# Patient Record
Sex: MALE | Race: WHITE | NOT HISPANIC OR LATINO | URBAN - METROPOLITAN AREA
[De-identification: names, ages, dates, MRNs, and addresses within clinical notes are randomized per-mention and may not be internally consistent; named-entity substitution may affect disease eponyms.]

---
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
## 2024-01-12 DIAGNOSIS — D66 HEMOPHILIA A: ICD-10-CM

## 2024-01-12 RX ORDER — ANTIHEMOPH.FVIII REC,FC FUSION 3000 UNIT
3000 VIAL (EA) INTRAVENOUS
Qty: 8 EACH | Refills: 12 | Status: SHIPPED | OUTPATIENT
Start: 2024-01-15 | End: 2024-02-26 | Stop reason: SDUPTHER

## 2024-01-12 RX ORDER — ANTIHEMOPH.FVIII REC,FC FUSION 3000 UNIT
3000 VIAL (EA) INTRAVENOUS
Qty: 3 EACH | Refills: 3 | Status: SHIPPED | OUTPATIENT
Start: 2024-01-12 | End: 2024-02-26 | Stop reason: SDUPTHER

## 2024-01-22 ENCOUNTER — PATIENT MESSAGE (OUTPATIENT)
Dept: PEDIATRIC HEMATOLOGY/ONCOLOGY | Facility: CLINIC | Age: 19
End: 2024-01-22
Payer: COMMERCIAL

## 2024-02-19 ENCOUNTER — TELEPHONE (OUTPATIENT)
Dept: PEDIATRIC HEMATOLOGY/ONCOLOGY | Facility: CLINIC | Age: 19
End: 2024-02-19
Payer: COMMERCIAL

## 2024-02-19 DIAGNOSIS — D66 HEMOPHILIA A: Primary | ICD-10-CM

## 2024-02-19 RX ORDER — ANTIHEMOPHILIC FACTOR (RECOMBINANT), FC-VWF-XTEN FUSION PROTEIN-EHTL 4000 (+/-)
4000 KIT INTRAVENOUS WEEKLY
Qty: 12 EACH | Refills: 4 | Status: SHIPPED | OUTPATIENT
Start: 2024-02-19 | End: 2024-02-22 | Stop reason: SDUPTHER

## 2024-02-20 ENCOUNTER — TELEPHONE (OUTPATIENT)
Dept: PEDIATRIC HEMATOLOGY/ONCOLOGY | Facility: CLINIC | Age: 19
End: 2024-02-20
Payer: COMMERCIAL

## 2024-02-20 NOTE — TELEPHONE ENCOUNTER
Called mom regarding her request for Altuviio. Informed mom that medication had been approved and sent to [facility] at a dose of 4000U once weekly per Dr Verdugo's recommendations.     ----- Message from Jose Verdugo MD sent at 2/19/2024  3:20 PM CST -----  Regarding: RE: Altuviio  Sent to [facility]    4000u once a  week  ----- Message -----  From: Loren Fong RN  Sent: 2/19/2024   3:11 PM CST  To: Jose Verdugo MD  Subject: Altuviio                                         Called mom back regarding Altuviio. She would like to give it a try for Jhonathan if you are still ok with it.

## 2024-02-22 DIAGNOSIS — D66 HEMOPHILIA A: ICD-10-CM

## 2024-02-22 RX ORDER — ANTIHEMOPHILIC FACTOR (RECOMBINANT), FC-VWF-XTEN FUSION PROTEIN-EHTL 4000 (+/-)
4000 KIT INTRAVENOUS WEEKLY
Qty: 12 EACH | Refills: 4 | Status: SHIPPED | OUTPATIENT
Start: 2024-02-22 | End: 2024-02-26 | Stop reason: SDUPTHER

## 2024-02-26 ENCOUNTER — PATIENT MESSAGE (OUTPATIENT)
Dept: PEDIATRIC HEMATOLOGY/ONCOLOGY | Facility: CLINIC | Age: 19
End: 2024-02-26
Payer: COMMERCIAL

## 2024-02-26 DIAGNOSIS — D66 HEMOPHILIA A: ICD-10-CM

## 2024-02-26 RX ORDER — ANTIHEMOPH.FVIII REC,FC FUSION 3000 UNIT
3000 VIAL (EA) INTRAVENOUS
Qty: 3 EACH | Refills: 3 | Status: SHIPPED | OUTPATIENT
Start: 2024-02-26

## 2024-02-26 RX ORDER — ANTIHEMOPHILIC FACTOR (RECOMBINANT), FC-VWF-XTEN FUSION PROTEIN-EHTL 4000 (+/-)
4000 KIT INTRAVENOUS WEEKLY
Qty: 12 EACH | Refills: 4 | Status: SHIPPED | OUTPATIENT
Start: 2024-02-26

## 2024-02-26 RX ORDER — ANTIHEMOPH.FVIII REC,FC FUSION 3000 UNIT
3000 VIAL (EA) INTRAVENOUS
Qty: 8 EACH | Refills: 12 | Status: SHIPPED | OUTPATIENT
Start: 2024-02-26

## 2024-02-27 ENCOUNTER — TELEPHONE (OUTPATIENT)
Dept: PEDIATRIC HEMATOLOGY/ONCOLOGY | Facility: CLINIC | Age: 19
End: 2024-02-27
Payer: COMMERCIAL

## 2024-02-27 NOTE — TELEPHONE ENCOUNTER
[id number]- Received call from Tam ([facility]) regarding rx sent yesterday for Eloctate and Altuviiio.   [phone]- Clinicals faxed to [phone]

## 2024-03-01 ENCOUNTER — TELEPHONE (OUTPATIENT)
Dept: PEDIATRIC HEMATOLOGY/ONCOLOGY | Facility: CLINIC | Age: 19
End: 2024-03-01
Payer: COMMERCIAL

## 2024-03-01 NOTE — TELEPHONE ENCOUNTER
Called [facility] [phone], spoke to Tam. Clarifications given regarding multiple prescriptions sent in. Informed them that patient's mother requested that pt try Altuviiio since once weekly as opposed to Eloctate twice a week. Tam to dispense both medications. Will see how patient tolerates medication and adjust accordingly.     ----- Message from Shilpa Herrera sent at 3/1/2024 11:56 AM CST -----  Contact: Pharmacy [phone]  Pharmacy is calling to clarify or change an RX.  RX name:  FVIII rec,Fc-VWF-XTEN,BDD-ehtl (ALTUVIIIO) 4000 (+/-) unit SolR /factor VIII rec,Fc fusion prot (ELOCTATE) 3,000 unit injection   What do they need to clarify:  calling to receive clarification   Additional comments: Juan Carlos

## 2024-07-25 ENCOUNTER — LAB VISIT (OUTPATIENT)
Dept: LAB | Facility: HOSPITAL | Age: 19
End: 2024-07-25
Attending: PEDIATRICS
Payer: COMMERCIAL

## 2024-07-25 ENCOUNTER — OFFICE VISIT (OUTPATIENT)
Dept: PEDIATRIC HEMATOLOGY/ONCOLOGY | Facility: CLINIC | Age: 19
End: 2024-07-25
Payer: COMMERCIAL

## 2024-07-25 VITALS
WEIGHT: 207.81 LBS | TEMPERATURE: 98 F | BODY MASS INDEX: 28.15 KG/M2 | SYSTOLIC BLOOD PRESSURE: 139 MMHG | HEART RATE: 81 BPM | HEIGHT: 72 IN | DIASTOLIC BLOOD PRESSURE: 75 MMHG | RESPIRATION RATE: 18 BRPM

## 2024-07-25 DIAGNOSIS — D66 HEMOPHILIA A: Primary | ICD-10-CM

## 2024-07-25 DIAGNOSIS — D66 HEMOPHILIA A: ICD-10-CM

## 2024-07-25 LAB
BASOPHILS # BLD AUTO: 0.03 K/UL (ref 0–0.2)
BASOPHILS NFR BLD: 0.4 % (ref 0–1.9)
DIFFERENTIAL METHOD BLD: NORMAL
EOSINOPHIL # BLD AUTO: 0 K/UL (ref 0–0.5)
EOSINOPHIL NFR BLD: 0.5 % (ref 0–8)
ERYTHROCYTE [DISTWIDTH] IN BLOOD BY AUTOMATED COUNT: 12.5 % (ref 11.5–14.5)
HCT VFR BLD AUTO: 44.4 % (ref 40–54)
HGB BLD-MCNC: 15.2 G/DL (ref 14–18)
IMM GRANULOCYTES # BLD AUTO: 0.02 K/UL (ref 0–0.04)
IMM GRANULOCYTES NFR BLD AUTO: 0.3 % (ref 0–0.5)
LYMPHOCYTES # BLD AUTO: 2 K/UL (ref 1–4.8)
LYMPHOCYTES NFR BLD: 24.9 % (ref 18–48)
MCH RBC QN AUTO: 29.9 PG (ref 27–31)
MCHC RBC AUTO-ENTMCNC: 34.2 G/DL (ref 32–36)
MCV RBC AUTO: 87 FL (ref 82–98)
MONOCYTES # BLD AUTO: 0.5 K/UL (ref 0.3–1)
MONOCYTES NFR BLD: 6.6 % (ref 4–15)
NEUTROPHILS # BLD AUTO: 5.4 K/UL (ref 1.8–7.7)
NEUTROPHILS NFR BLD: 67.3 % (ref 38–73)
NRBC BLD-RTO: 0 /100 WBC
PLATELET # BLD AUTO: 259 K/UL (ref 150–450)
PMV BLD AUTO: 11.1 FL (ref 9.2–12.9)
RBC # BLD AUTO: 5.09 M/UL (ref 4.6–6.2)
RETICS/RBC NFR AUTO: 2 % (ref 0.4–2)
WBC # BLD AUTO: 7.99 K/UL (ref 3.9–12.7)

## 2024-07-25 PROCEDURE — 85240 CLOT FACTOR VIII AHG 1 STAGE: CPT | Performed by: PEDIATRICS

## 2024-07-25 PROCEDURE — 36415 COLL VENOUS BLD VENIPUNCTURE: CPT | Performed by: PEDIATRICS

## 2024-07-25 PROCEDURE — 85025 COMPLETE CBC W/AUTO DIFF WBC: CPT | Performed by: PEDIATRICS

## 2024-07-25 PROCEDURE — 99999 PR PBB SHADOW E&M-EST. PATIENT-LVL III: CPT | Mod: PBBFAC,,, | Performed by: PEDIATRICS

## 2024-07-25 PROCEDURE — 85045 AUTOMATED RETICULOCYTE COUNT: CPT | Performed by: PEDIATRICS

## 2024-07-25 PROCEDURE — 85335 FACTOR INHIBITOR TEST: CPT | Performed by: PEDIATRICS

## 2024-07-25 RX ORDER — TRANEXAMIC ACID 650 MG/1
1300 TABLET ORAL 3 TIMES DAILY
Qty: 50 TABLET | Refills: 3 | Status: SHIPPED | OUTPATIENT
Start: 2024-07-25

## 2024-07-25 RX ORDER — ANTIHEMOPH.FVIII REC,FC FUSION 3000 UNIT
3000 VIAL (EA) INTRAVENOUS
Qty: 3 EACH | Refills: 3 | Status: SHIPPED | OUTPATIENT
Start: 2024-07-25

## 2024-07-25 RX ORDER — ANTIHEMOPHILIC FACTOR (RECOMBINANT), FC-VWF-XTEN FUSION PROTEIN-EHTL 4000 (+/-)
4000 KIT INTRAVENOUS WEEKLY
Qty: 12 EACH | Refills: 4 | Status: SHIPPED | OUTPATIENT
Start: 2024-07-25

## 2024-07-25 NOTE — PROGRESS NOTES
"Subjective:       Patient ID: Jhonathan Langston is a 19 y.o. male.    Chief Complaint: No chief complaint on file.  Jhonathan is a 19year old with a new diagnosis of mild Factor 8 deficiency    Initial consult: He hasnt had any real bleeding problems although he does bruise easily.  Jhonathan's brother Chandrakant was diagnosed with mild F8 deficiency so Jhonathan was screened.  Found to have a PTT 46.8 and F8 level of 24  Maternal family member was a "free bleeder"    Interim history:  Done well since last visit. Nate Altuvioo extremely well with no breakthrough bleeding   Did extremely well with no bleeding or need for extra factor    HPI  Review of Systems   Constitutional: Negative for activity change, appetite change, chills, diaphoresis, fatigue, fever and unexpected weight change.   HENT: Negative for hearing loss, mouth sores, nosebleeds, postnasal drip, rhinorrhea and sore throat.    Eyes: Negative for photophobia and visual disturbance.   Respiratory: Negative for cough and shortness of breath.    Cardiovascular: Negative for chest pain and palpitations.   Gastrointestinal: Negative for abdominal distention, abdominal pain, blood in stool, constipation, diarrhea, nausea and vomiting.   Genitourinary: Negative for decreased urine volume, dysuria, flank pain, frequency and hematuria.   Musculoskeletal: Negative for gait problem, joint swelling, neck pain and neck stiffness.   Skin: Negative for color change, pallor and rash.   Neurological: Negative for dizziness, tremors, seizures, weakness and headaches.   Hematological: Negative for adenopathy. Bruises/bleeds easily.   Psychiatric/Behavioral: The patient is not nervous/anxious.        Objective:      Physical Exam   Constitutional: He is oriented to person, place, and time. He appears well-developed and well-nourished.   HENT:   Head: Normocephalic and atraumatic.   Right Ear: External ear normal.   Left Ear: External ear normal.   Nose: Nose normal.   Mouth/Throat: " Oropharynx is clear and moist.   Eyes: Pupils are equal, round, and reactive to light. EOM are normal.   Neck: Normal range of motion. Neck supple.   Cardiovascular: Normal rate and regular rhythm. Exam reveals no gallop and no friction rub.   No murmur heard.  Pulmonary/Chest: Effort normal and breath sounds normal. He has no wheezes. He has no rales.   Abdominal: Soft. Bowel sounds are normal. He exhibits no distension and no mass. There is no tenderness. There is no rebound and no guarding.   Musculoskeletal: Normal range of motion.   Lymphadenopathy:     He has no cervical adenopathy.   Neurological: He is alert and oriented to person, place, and time. He has normal reflexes. He exhibits normal muscle tone.   Skin: Skin is warm. No rash noted. No erythema. No pallor.           Latest Reference Range & Units 06/28/23 14:25   WBC 3.90 - 12.70 K/uL 6.90   RBC 4.60 - 6.20 M/uL 5.36   Hemoglobin 14.0 - 18.0 g/dL 15.5   Hematocrit 40.0 - 54.0 % 45.8   MCV 82 - 98 fL 85   MCH 27.0 - 31.0 pg 28.9   MCHC 32.0 - 36.0 g/dL 33.8   RDW 11.5 - 14.5 % 12.8   Platelets 150 - 450 K/uL 240   MPV 9.2 - 12.9 fL 10.8   Gran % 38.0 - 73.0 % 60.6   Lymph % 18.0 - 48.0 % 29.1   Mono % 4.0 - 15.0 % 8.0   Eosinophil % 0.0 - 8.0 % 1.4   Basophil % 0.0 - 1.9 % 0.6   Immature Granulocytes 0.0 - 0.5 % 0.3   Gran # (ANC) 1.8 - 7.7 K/uL 4.2   Lymph # 1.0 - 4.8 K/uL 2.0   Mono # 0.3 - 1.0 K/uL 0.6   Eos # 0.0 - 0.5 K/uL 0.1   Baso # 0.00 - 0.20 K/uL 0.04   Immature Grans (Abs) 0.00 - 0.04 K/uL 0.02   nRBC 0 /100 WBC 0   Differential Method  Automated   Retic 0.4 - 2.0 % 1.6   Factor VIII Activity 60 - 170 % 28 (L)   (L): Data is abnormally low  Assessment:       1. Hemophilia A        Plan:       20 yo with mild Factor 8 Deficiency       I expect a very mild bleeding history given his factor level.  Mostly I would be scared of bleeding from trauma or from planned surgical procedures.  I discussed avoidance of heavy contact activities but each  of these can be individually discussed.  HE is passionate about playing football and has a scholarship to play football. Both him and parents understand the risk - yet want to play anyways.   Given multiple uses of factor - we have agreed to try prophylaxis during football season.  Will give Altuvioo weekly .  He tolerated this well without any major bleeding issues  Will use Eloctate for rescue bleeding as well.  Script sent as well     I have also sent in a script for Lysteda in case of mucosal bleeding      Dental every 6 months  Contact numbers given for emergency         I will f/u with family in 1 yr or sooner if other bleeding problems    I spent 60 min with family >50% in counseling

## 2024-07-26 LAB — FACT VIII ACT/NOR PPP: 19 % (ref 60–170)

## 2024-07-29 LAB — FACT VIII INHIB PPP-ACNC: 0 BU

## 2024-11-14 ENCOUNTER — TELEPHONE (OUTPATIENT)
Dept: PEDIATRIC HEMATOLOGY/ONCOLOGY | Facility: CLINIC | Age: 19
End: 2024-11-14
Payer: COMMERCIAL

## 2024-11-14 NOTE — TELEPHONE ENCOUNTER
"Pt's mom called, reports pt rolled his ankle at football practice today and heard a pop, has significant swelling to his ankle, is asking what she should do. Instructed mom to bring pt to ED for evaluation and to administer rescue dose of factor. 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 Mom reports pt received his prescribed Altuviiio dose on Sunday, is asking if he needs another dose. Informed mom per Dr Verdugo's note from July that pt is to use Eloctate for rescue bleeding, asked mom if pt has that on hand and if he has received it since his injury today. Mom states they do have eloctate and he has not received a dose yet. Instructed mom to administer eloctate dose to pt now, pt to ice and elevate ankle, keep this office updated, call after his xray tomorrow with update. Mom repeated back and verbalized understanding.   "

## 2024-11-15 ENCOUNTER — PATIENT MESSAGE (OUTPATIENT)
Dept: PEDIATRIC HEMATOLOGY/ONCOLOGY | Facility: CLINIC | Age: 19
End: 2024-11-15
Payer: COMMERCIAL

## 2024-11-15 ENCOUNTER — TELEPHONE (OUTPATIENT)
Dept: PEDIATRIC HEMATOLOGY/ONCOLOGY | Facility: CLINIC | Age: 19
End: 2024-11-15
Payer: COMMERCIAL

## 2024-11-15 NOTE — TELEPHONE ENCOUNTER
"Pt's mom called this morning with an update on pt's ankle. Mom states pt was in a lot of pain last night so they did try to get him seen at [facility] who is usually open until 7 PM per mom but they closed at 5 PM yesterday, so they took pt to ED but was told it would be a 4 hour wait, so they returned home and iced and elevated pt's ankle. Mom confirmed pt received dose of eloctate yesterday evening and did notice that swelling went down some within an hour of factor dose. Mom reports pt seen at [facility] this morning, xray done that showed no broken bones, "looks like a bad sprain" but unable to see if there is any soft tissue/ligament damage. Mom states pt was put in a boot x 2 weeks and will follow up with PT next week, will follow up with [facility] in about 2 weeks to reassess if better vs needing MRI. Mom states pt prescribed a steroid pack. Mom reports pt has 2 doses of Eloctate at home and 4 doses of Altuviiio, would be due his weekly Altuviiio dose on Sunday. Informed Dr Verdugo of above, he state pt to get repeat Eloctate factor dose today and Altuviiio dose tomorrow instead of Sunday, call Monday with an update. Informed mom of above info and instructions, she verbalized complete understanding.   "

## 2025-07-07 ENCOUNTER — TELEPHONE (OUTPATIENT)
Dept: PEDIATRIC HEMATOLOGY/ONCOLOGY | Facility: CLINIC | Age: 20
End: 2025-07-07
Payer: COMMERCIAL

## 2025-07-07 NOTE — TELEPHONE ENCOUNTER
Maranda KING MA called the patient's parent/guardian to schedule  a follow-up appointment with Dr. Jose Verdugo M.D.. No answer. Left a voicemail message with the callback number for scheduling.

## 2025-07-14 ENCOUNTER — PATIENT MESSAGE (OUTPATIENT)
Dept: PEDIATRIC HEMATOLOGY/ONCOLOGY | Facility: CLINIC | Age: 20
End: 2025-07-14
Payer: COMMERCIAL

## 2025-07-14 DIAGNOSIS — D66 HEMOPHILIA A: Primary | ICD-10-CM
